# Patient Record
Sex: FEMALE | Race: WHITE | Employment: OTHER | URBAN - METROPOLITAN AREA
[De-identification: names, ages, dates, MRNs, and addresses within clinical notes are randomized per-mention and may not be internally consistent; named-entity substitution may affect disease eponyms.]

---

## 2017-01-26 ENCOUNTER — APPOINTMENT (OUTPATIENT)
Dept: GENERAL RADIOLOGY | Facility: HOSPITAL | Age: 82
End: 2017-01-26
Attending: EMERGENCY MEDICINE
Payer: MEDICARE

## 2017-01-26 ENCOUNTER — APPOINTMENT (OUTPATIENT)
Dept: CT IMAGING | Facility: HOSPITAL | Age: 82
End: 2017-01-26
Attending: EMERGENCY MEDICINE
Payer: MEDICARE

## 2017-01-26 ENCOUNTER — HOSPITAL ENCOUNTER (EMERGENCY)
Facility: HOSPITAL | Age: 82
Discharge: HOME | End: 2017-01-26
Attending: EMERGENCY MEDICINE
Payer: MEDICARE

## 2017-01-26 VITALS
WEIGHT: 140 LBS | BODY MASS INDEX: 25.76 KG/M2 | TEMPERATURE: 99 F | HEART RATE: 113 BPM | OXYGEN SATURATION: 96 % | DIASTOLIC BLOOD PRESSURE: 89 MMHG | SYSTOLIC BLOOD PRESSURE: 159 MMHG | HEIGHT: 62 IN | RESPIRATION RATE: 20 BRPM

## 2017-01-26 DIAGNOSIS — R53.1 WEAKNESS GENERALIZED: Primary | ICD-10-CM

## 2017-01-26 LAB
ALBUMIN SERPL BCP-MCNC: 4 G/DL (ref 3.5–4.8)
ALBUMIN/GLOB SERPL: 1.5 {RATIO} (ref 1–2)
ALP SERPL-CCNC: 82 U/L (ref 32–100)
ALT SERPL-CCNC: 20 U/L (ref 14–54)
ANION GAP SERPL CALC-SCNC: 11 MMOL/L (ref 0–18)
AST SERPL-CCNC: 23 U/L (ref 15–41)
BASOPHILS # BLD: 0.1 K/UL (ref 0–0.2)
BASOPHILS NFR BLD: 1 %
BILIRUB SERPL-MCNC: 0.9 MG/DL (ref 0.3–1.2)
BILIRUB UR QL: NEGATIVE
BUN SERPL-MCNC: 13 MG/DL (ref 8–20)
BUN/CREAT SERPL: 9.2 (ref 10–20)
CALCIUM SERPL-MCNC: 9.4 MG/DL (ref 8.5–10.5)
CHLORIDE SERPL-SCNC: 104 MMOL/L (ref 95–110)
CLARITY UR: CLEAR
CO2 SERPL-SCNC: 24 MMOL/L (ref 22–32)
COLOR UR: YELLOW
CREAT SERPL-MCNC: 1.41 MG/DL (ref 0.5–1.5)
EOSINOPHIL # BLD: 0.1 K/UL (ref 0–0.7)
EOSINOPHIL NFR BLD: 1 %
ERYTHROCYTE [DISTWIDTH] IN BLOOD BY AUTOMATED COUNT: 13.2 % (ref 11–15)
GLOBULIN PLAS-MCNC: 2.7 G/DL (ref 2.5–3.7)
GLUCOSE SERPL-MCNC: 111 MG/DL (ref 70–99)
GLUCOSE UR-MCNC: NEGATIVE MG/DL
HCT VFR BLD AUTO: 42.6 % (ref 35–48)
HGB BLD-MCNC: 14.5 G/DL (ref 12–16)
KETONES UR-MCNC: NEGATIVE MG/DL
LYMPHOCYTES # BLD: 1.6 K/UL (ref 1–4)
LYMPHOCYTES NFR BLD: 19 %
MCH RBC QN AUTO: 32.2 PG (ref 27–32)
MCHC RBC AUTO-ENTMCNC: 34 G/DL (ref 32–37)
MCV RBC AUTO: 94.8 FL (ref 80–100)
MONOCYTES # BLD: 0.5 K/UL (ref 0–1)
MONOCYTES NFR BLD: 6 %
NEUTROPHILS # BLD AUTO: 6.1 K/UL (ref 1.8–7.7)
NEUTROPHILS NFR BLD: 73 %
NITRITE UR QL STRIP.AUTO: NEGATIVE
OSMOLALITY UR CALC.SUM OF ELEC: 289 MOSM/KG (ref 275–295)
PH UR: 7 [PH] (ref 5–8)
PLATELET # BLD AUTO: 221 K/UL (ref 140–400)
PMV BLD AUTO: 8.8 FL (ref 7.4–10.3)
POTASSIUM SERPL-SCNC: 3.9 MMOL/L (ref 3.3–5.1)
PROT SERPL-MCNC: 6.7 G/DL (ref 5.9–8.4)
PROT UR-MCNC: NEGATIVE MG/DL
RBC # BLD AUTO: 4.49 M/UL (ref 3.7–5.4)
RBC #/AREA URNS AUTO: 3 /HPF
SODIUM SERPL-SCNC: 139 MMOL/L (ref 136–144)
SP GR UR STRIP: 1 (ref 1–1.03)
TROPONIN I SERPL-MCNC: 0.01 NG/ML (ref ?–0.03)
TSH SERPL-ACNC: 1.91 UIU/ML (ref 0.34–5.6)
UROBILINOGEN UR STRIP-ACNC: <2
VIT C UR-MCNC: NEGATIVE MG/DL
WBC # BLD AUTO: 8.4 K/UL (ref 4–11)
WBC #/AREA URNS AUTO: 2 /HPF

## 2017-01-26 PROCEDURE — 84484 ASSAY OF TROPONIN QUANT: CPT | Performed by: EMERGENCY MEDICINE

## 2017-01-26 PROCEDURE — 74176 CT ABD & PELVIS W/O CONTRAST: CPT

## 2017-01-26 PROCEDURE — 81001 URINALYSIS AUTO W/SCOPE: CPT | Performed by: EMERGENCY MEDICINE

## 2017-01-26 PROCEDURE — 80053 COMPREHEN METABOLIC PANEL: CPT | Performed by: EMERGENCY MEDICINE

## 2017-01-26 PROCEDURE — 93010 ELECTROCARDIOGRAM REPORT: CPT | Performed by: EMERGENCY MEDICINE

## 2017-01-26 PROCEDURE — 93005 ELECTROCARDIOGRAM TRACING: CPT

## 2017-01-26 PROCEDURE — 71010 XR CHEST AP PORTABLE  (CPT=71010): CPT

## 2017-01-26 PROCEDURE — 85025 COMPLETE CBC W/AUTO DIFF WBC: CPT | Performed by: EMERGENCY MEDICINE

## 2017-01-26 PROCEDURE — 96374 THER/PROPH/DIAG INJ IV PUSH: CPT

## 2017-01-26 PROCEDURE — 84443 ASSAY THYROID STIM HORMONE: CPT | Performed by: EMERGENCY MEDICINE

## 2017-01-26 PROCEDURE — 99284 EMERGENCY DEPT VISIT MOD MDM: CPT

## 2017-01-26 RX ORDER — HYDRALAZINE HYDROCHLORIDE 20 MG/ML
10 INJECTION INTRAMUSCULAR; INTRAVENOUS ONCE
Status: COMPLETED | OUTPATIENT
Start: 2017-01-26 | End: 2017-01-26

## 2017-01-26 RX ORDER — LORAZEPAM 1 MG/1
0.5 TABLET ORAL ONCE
Status: COMPLETED | OUTPATIENT
Start: 2017-01-26 | End: 2017-01-26

## 2017-01-26 NOTE — ED INITIAL ASSESSMENT (HPI)
Via [facility] from [facility], c/o weakness, \"feeling out of it\" and generally not feeling well    Denies pain, denies cough, denies fever

## 2017-01-26 NOTE — ED NOTES
Pt presents to ER with c/o \"not feeling well\" since yesterday. Pt denies cough or fever at home. Pt denies chest pain or sob. Hx of HTN. Pt c/o blurred vision, headache, and dizziness. Steady gait noted. Face symmetrical. Tongue midline.  Pupils equal and

## 2017-01-26 NOTE — ED NOTES
Pt states that she is feeling anxious and that her  is very sick and it is really upsetting her. Dr Geoff Saha at bedside to discuss plan of care with pt and pts family member at this time.

## 2017-01-28 NOTE — ED PROVIDER NOTES
Patient Seen in: [facility] AND [facility] Emergency Department    History   Patient presents with:  Weakness    Stated Complaint: not feeling well    HPI    Patient complains of gen malaise, not sleeping, elevated blood pressure for several days.    is si Take 1 tablet (40 mg total) by mouth nightly. aspirin 325 MG Oral Tab EC,  Take 325 mg by mouth daily.        Family History   Problem Relation Age of Onset   • Psychiatric Son      suicide         Smoking Status: Never Smoker                      Smokel components within normal limits   COMP METABOLIC PANEL (14) - Abnormal; Notable for the following:     Glucose 111 (*)     BUN/CREA Ratio 9.2 (*)     GFR, Non- 36 (*)     GFR, -American 43 (*)     All other components within normal l

## 2017-02-20 NOTE — PROGRESS NOTES
HPI:    Patient ID: Ailin Walton is a 80year old female. HPIpt here for a follwup and evaluation. Accompanied by son Rosemary Garrett. Is doing fairly well. Had an er visit for elevated Bp had blood pressure.     Review of Systems   Constitutional: Negative morning before breakfast. Disp: 90 capsule Rfl: 3   aspirin 325 MG Oral Tab EC Take 325 mg by mouth daily.  Disp:  Rfl:      Allergies:  Penicillins               Sulfa Antibiotics          PHYSICAL EXAM:   Physical Exam   Constitutional: She is oriented to

## 2017-02-24 ENCOUNTER — MED REC SCAN ONLY (OUTPATIENT)
Dept: INTERNAL MEDICINE CLINIC | Facility: CLINIC | Age: 82
End: 2017-02-24

## 2017-03-14 ENCOUNTER — MED REC SCAN ONLY (OUTPATIENT)
Dept: INTERNAL MEDICINE CLINIC | Facility: CLINIC | Age: 82
End: 2017-03-14

## 2017-03-15 ENCOUNTER — LAB ENCOUNTER (OUTPATIENT)
Dept: LAB | Age: 82
End: 2017-03-15
Attending: Other
Payer: MEDICARE

## 2017-03-15 DIAGNOSIS — R35.0 URINARY FREQUENCY: ICD-10-CM

## 2017-03-15 LAB
BILIRUB UR QL STRIP.AUTO: NEGATIVE
COLOR UR AUTO: YELLOW
GLUCOSE UR STRIP.AUTO-MCNC: NEGATIVE MG/DL
KETONES UR STRIP.AUTO-MCNC: NEGATIVE MG/DL
NITRITE UR QL STRIP.AUTO: NEGATIVE
PH UR STRIP.AUTO: 7 [PH] (ref 4.5–8)
PROT UR STRIP.AUTO-MCNC: 30 MG/DL
RBC #/AREA URNS AUTO: >10 /HPF
SP GR UR STRIP.AUTO: 1.01 (ref 1–1.03)
UROBILINOGEN UR STRIP.AUTO-MCNC: <2 MG/DL

## 2017-03-15 PROCEDURE — 87186 SC STD MICRODIL/AGAR DIL: CPT

## 2017-03-15 PROCEDURE — 87077 CULTURE AEROBIC IDENTIFY: CPT

## 2017-03-15 PROCEDURE — 87086 URINE CULTURE/COLONY COUNT: CPT

## 2017-03-15 PROCEDURE — 81001 URINALYSIS AUTO W/SCOPE: CPT

## 2017-06-07 ENCOUNTER — LAB REQUISITION (OUTPATIENT)
Dept: LAB | Facility: HOSPITAL | Age: 82
End: 2017-06-07
Payer: MEDICARE

## 2017-06-07 DIAGNOSIS — N39.0 URINARY TRACT INFECTION: ICD-10-CM

## 2017-06-07 PROCEDURE — 87086 URINE CULTURE/COLONY COUNT: CPT | Performed by: INTERNAL MEDICINE

## 2017-06-07 PROCEDURE — 81001 URINALYSIS AUTO W/SCOPE: CPT | Performed by: INTERNAL MEDICINE

## 2017-06-07 PROCEDURE — 87186 SC STD MICRODIL/AGAR DIL: CPT | Performed by: INTERNAL MEDICINE

## 2017-06-07 PROCEDURE — 87077 CULTURE AEROBIC IDENTIFY: CPT | Performed by: INTERNAL MEDICINE

## 2018-01-01 ENCOUNTER — LAB REQUISITION (OUTPATIENT)
Dept: LAB | Facility: HOSPITAL | Age: 83
End: 2018-01-01
Payer: MEDICARE

## 2018-01-01 ENCOUNTER — HOSPITAL ENCOUNTER (OUTPATIENT)
Dept: GENERAL RADIOLOGY | Facility: HOSPITAL | Age: 83
Discharge: HOME | End: 2018-01-01
Attending: INTERNAL MEDICINE
Payer: MEDICARE

## 2018-01-01 ENCOUNTER — APPOINTMENT (OUTPATIENT)
Dept: CT IMAGING | Facility: HOSPITAL | Age: 83
End: 2018-01-01
Attending: EMERGENCY MEDICINE
Payer: MEDICARE

## 2018-01-01 ENCOUNTER — HOSPITAL ENCOUNTER (OUTPATIENT)
Facility: HOSPITAL | Age: 83
Setting detail: OBSERVATION
Discharge: NURSING, REHAB OR LONG-TERM CARE | End: 2018-01-01
Attending: EMERGENCY MEDICINE | Admitting: INTERNAL MEDICINE
Payer: MEDICARE

## 2018-01-01 VITALS
OXYGEN SATURATION: 97 % | HEIGHT: 62 IN | BODY MASS INDEX: 23.19 KG/M2 | WEIGHT: 126 LBS | HEART RATE: 66 BPM | SYSTOLIC BLOOD PRESSURE: 154 MMHG | RESPIRATION RATE: 18 BRPM | DIASTOLIC BLOOD PRESSURE: 80 MMHG | TEMPERATURE: 98 F

## 2018-01-01 DIAGNOSIS — R47.1 DYSARTHRIA: ICD-10-CM

## 2018-01-01 DIAGNOSIS — N39.0 URINARY TRACT INFECTION WITH HEMATURIA, SITE UNSPECIFIED: Primary | ICD-10-CM

## 2018-01-01 DIAGNOSIS — R31.9 URINARY TRACT INFECTION WITH HEMATURIA, SITE UNSPECIFIED: Primary | ICD-10-CM

## 2018-01-01 DIAGNOSIS — E86.0 DEHYDRATION: ICD-10-CM

## 2018-01-01 DIAGNOSIS — N28.9 RENAL INSUFFICIENCY: ICD-10-CM

## 2018-01-01 DIAGNOSIS — G30.9 ALZHEIMER'S DISEASE, UNSPECIFIED (CODE) (HCC): ICD-10-CM

## 2018-01-01 DIAGNOSIS — R42 DIZZINESS AND GIDDINESS: ICD-10-CM

## 2018-01-01 DIAGNOSIS — M25.551 HIP PAIN, ACUTE, RIGHT: ICD-10-CM

## 2018-01-01 DIAGNOSIS — F41.9 ANXIETY DISORDER: ICD-10-CM

## 2018-01-01 DIAGNOSIS — M79.651 ACUTE THIGH PAIN, RIGHT: ICD-10-CM

## 2018-01-01 PROCEDURE — 90792 PSYCH DIAG EVAL W/MED SRVCS: CPT | Performed by: OTHER

## 2018-01-01 PROCEDURE — 73552 X-RAY EXAM OF FEMUR 2/>: CPT | Performed by: INTERNAL MEDICINE

## 2018-01-01 PROCEDURE — 82040 ASSAY OF SERUM ALBUMIN: CPT | Performed by: INTERNAL MEDICINE

## 2018-01-01 PROCEDURE — 70450 CT HEAD/BRAIN W/O DYE: CPT | Performed by: EMERGENCY MEDICINE

## 2018-01-01 PROCEDURE — 73503 X-RAY EXAM HIP UNI 4/> VIEWS: CPT | Performed by: INTERNAL MEDICINE

## 2018-01-01 RX ORDER — QUETIAPINE 25 MG/1
25 TABLET, FILM COATED ORAL NIGHTLY PRN
COMMUNITY

## 2018-01-01 RX ORDER — SODIUM CHLORIDE 450 MG/100ML
INJECTION, SOLUTION INTRAVENOUS CONTINUOUS
Status: DISCONTINUED | OUTPATIENT
Start: 2018-01-01 | End: 2018-01-01

## 2018-01-01 RX ORDER — CELLULOSE
1 CAPSULE ORAL AS NEEDED
COMMUNITY

## 2018-01-01 RX ORDER — ACETAMINOPHEN 325 MG/1
325 TABLET ORAL EVERY 6 HOURS PRN
Status: DISCONTINUED | OUTPATIENT
Start: 2018-01-01 | End: 2018-01-01

## 2018-01-01 RX ORDER — MULTIPLE VITAMINS W/ MINERALS TAB 9MG-400MCG
1 TAB ORAL DAILY
Status: DISCONTINUED | OUTPATIENT
Start: 2018-01-01 | End: 2018-01-01

## 2018-01-01 RX ORDER — ONDANSETRON 4 MG/1
4 TABLET, ORALLY DISINTEGRATING ORAL EVERY 8 HOURS PRN
COMMUNITY

## 2018-01-01 RX ORDER — RIVASTIGMINE 9.5 MG/24H
1 PATCH, EXTENDED RELEASE TRANSDERMAL DAILY
Status: DISCONTINUED | OUTPATIENT
Start: 2018-01-01 | End: 2018-01-01

## 2018-01-01 RX ORDER — CEFUROXIME AXETIL 250 MG/1
500 TABLET ORAL DAILY
Status: DISCONTINUED | OUTPATIENT
Start: 2018-01-01 | End: 2018-01-01

## 2018-01-01 RX ORDER — ACETAMINOPHEN 325 MG/1
325 TABLET ORAL EVERY 6 HOURS PRN
COMMUNITY

## 2018-01-01 RX ORDER — QUETIAPINE 25 MG/1
25 TABLET, FILM COATED ORAL NIGHTLY PRN
Status: DISCONTINUED | OUTPATIENT
Start: 2018-01-01 | End: 2018-01-01

## 2018-01-01 RX ORDER — SACCHAROMYCES BOULARDII 250 MG
250 CAPSULE ORAL 2 TIMES DAILY
Status: DISCONTINUED | OUTPATIENT
Start: 2018-01-01 | End: 2018-01-01

## 2018-01-01 RX ORDER — CEFUROXIME AXETIL 250 MG/1
250 TABLET ORAL EVERY 12 HOURS SCHEDULED
Qty: 14 TABLET | Refills: 0 | Status: SHIPPED | OUTPATIENT
Start: 2018-01-01

## 2018-01-01 RX ORDER — IBUPROFEN 200 MG
200 TABLET ORAL EVERY 6 HOURS PRN
Status: ON HOLD | COMMUNITY
End: 2018-01-01

## 2018-01-01 RX ORDER — QUETIAPINE 25 MG/1
50 TABLET, FILM COATED ORAL ONCE
Status: COMPLETED | OUTPATIENT
Start: 2018-01-01 | End: 2018-01-01

## 2018-01-01 RX ORDER — SODIUM CHLORIDE 9 MG/ML
INJECTION, SOLUTION INTRAVENOUS ONCE
Status: COMPLETED | OUTPATIENT
Start: 2018-01-01 | End: 2018-01-01

## 2018-01-01 RX ORDER — ERGOCALCIFEROL 1.25 MG/1
50000 CAPSULE ORAL WEEKLY
Status: DISCONTINUED | OUTPATIENT
Start: 2018-01-01 | End: 2018-01-01

## 2018-01-01 RX ORDER — ONDANSETRON 4 MG/1
4 TABLET, ORALLY DISINTEGRATING ORAL EVERY 8 HOURS PRN
Status: DISCONTINUED | OUTPATIENT
Start: 2018-01-01 | End: 2018-01-01

## 2018-01-01 RX ORDER — ASPIRIN 325 MG
325 TABLET, DELAYED RELEASE (ENTERIC COATED) ORAL DAILY
Status: DISCONTINUED | OUTPATIENT
Start: 2018-01-01 | End: 2018-01-01

## 2018-01-01 RX ORDER — BUSPIRONE HYDROCHLORIDE 5 MG/1
10 TABLET ORAL 3 TIMES DAILY
Status: DISCONTINUED | OUTPATIENT
Start: 2018-01-01 | End: 2018-01-01

## 2018-01-01 RX ORDER — LORAZEPAM 0.5 MG/1
0.5 TABLET ORAL 3 TIMES DAILY
Status: DISCONTINUED | OUTPATIENT
Start: 2018-01-01 | End: 2018-01-01

## 2018-01-01 RX ORDER — IBUPROFEN 200 MG
200 TABLET ORAL EVERY 6 HOURS PRN
Status: DISCONTINUED | OUTPATIENT
Start: 2018-01-01 | End: 2018-01-01

## 2018-01-01 RX ORDER — LORAZEPAM 1 MG/1
0.5 TABLET ORAL ONCE
Status: COMPLETED | OUTPATIENT
Start: 2018-01-01 | End: 2018-01-01

## 2018-01-01 RX ORDER — QUETIAPINE 25 MG/1
50 TABLET, FILM COATED ORAL 3 TIMES DAILY
Status: DISCONTINUED | OUTPATIENT
Start: 2018-01-01 | End: 2018-01-01

## 2018-01-01 RX ORDER — RIVASTIGMINE 9.5 MG/24H
1 PATCH, EXTENDED RELEASE TRANSDERMAL DAILY
Status: ON HOLD | COMMUNITY
End: 2018-01-01

## 2018-01-01 RX ORDER — ATORVASTATIN CALCIUM 40 MG/1
40 TABLET, FILM COATED ORAL NIGHTLY
Status: DISCONTINUED | OUTPATIENT
Start: 2018-01-01 | End: 2018-01-01

## 2018-01-01 RX ORDER — LORAZEPAM 0.5 MG/1
0.5 TABLET ORAL DAILY
Status: DISCONTINUED | OUTPATIENT
Start: 2018-01-01 | End: 2018-01-01

## 2018-01-01 RX ORDER — CELLULOSE
1 CAPSULE ORAL AS NEEDED
Status: DISCONTINUED | OUTPATIENT
Start: 2018-01-01 | End: 2018-01-01 | Stop reason: RX

## 2018-01-01 RX ORDER — RIVASTIGMINE 9.5 MG/24H
1 PATCH, EXTENDED RELEASE TRANSDERMAL DAILY
COMMUNITY

## 2018-01-01 RX ORDER — PANTOPRAZOLE SODIUM 40 MG/1
40 TABLET, DELAYED RELEASE ORAL
Status: DISCONTINUED | OUTPATIENT
Start: 2018-01-01 | End: 2018-01-01

## 2018-01-01 RX ORDER — BUSPIRONE HYDROCHLORIDE 5 MG/1
10 TABLET ORAL ONCE
Status: COMPLETED | OUTPATIENT
Start: 2018-01-01 | End: 2018-01-01

## 2018-05-23 PROBLEM — R47.1 DYSARTHRIA: Status: ACTIVE | Noted: 2018-01-01

## 2018-05-23 PROBLEM — N39.0 URINARY TRACT INFECTION WITH HEMATURIA, SITE UNSPECIFIED: Status: ACTIVE | Noted: 2018-01-01

## 2018-05-23 PROBLEM — R31.9 URINARY TRACT INFECTION WITH HEMATURIA: Status: ACTIVE | Noted: 2018-01-01

## 2018-05-23 PROBLEM — R31.9 URINARY TRACT INFECTION WITH HEMATURIA, SITE UNSPECIFIED: Status: ACTIVE | Noted: 2018-01-01

## 2018-05-23 PROBLEM — N28.9 RENAL INSUFFICIENCY: Status: ACTIVE | Noted: 2018-01-01

## 2018-05-23 PROBLEM — N39.0 URINARY TRACT INFECTION WITH HEMATURIA: Status: ACTIVE | Noted: 2018-01-01

## 2018-05-23 PROBLEM — E86.0 DEHYDRATION: Status: ACTIVE | Noted: 2018-01-01

## 2018-05-23 NOTE — ED PROVIDER NOTES
Patient Seen in: [facility] AND [facility] Emergency Department    History   Patient presents with:  Eval-P (psychiatric)    Stated Complaint: psych    HPI    Patient presents to the emergency department with her daughter.   She has had alteration in her mental s needed for Nausea. LORazepam 0.5 MG Oral Tab,  AT 8AM, 2 PM and 7 PM   BusPIRone HCl 10 MG Oral Tab,  Take 1 tablet (10 mg total) by mouth 3 (three) times daily.  At 8 AM, 2 PM and 7 PM   QUEtiapine Fumarate (SEROQUEL) 50 MG Oral Tab,  Take one tablet by reviewed from today and agreed except as otherwise stated in HPI.     Physical Exam   ED Triage Vitals [05/23/18 8869]  BP: 131/88  Pulse: 95  Resp: 18  Temp: 97.3 °F (36.3 °C)  Temp src: Temporal  SpO2: 97 %  O2 Device: None (Room air)    Current:BP (!) 14 GFR I did contact and talk with Dr. Willem Kirkland primary care physician we will admit patient to the hospital he did talk to her daughter as well.   No deterioration while here    I spent a total of 40 minutes of critical care time in obtaining history, performing LAVENDER   RAINBOW DRAW DARK GREEN   RAINBOW DRAW LIGHT GREEN   RAINBOW DRAW GOLD   RAINBOW DRAW LAVENDER TALL (BNP)   URINE CULTURE, ROUTINE        MDM     97% Normal  Pulse oximetry         Disposition and Plan     We recommend that you schedule follow u

## 2018-05-23 NOTE — ED INITIAL ASSESSMENT (HPI)
Patient presents to ER with daughter after making suicidal remarks reporting that she wants to slit her wrist.  Hx dementia.

## 2018-05-24 NOTE — CM/SW NOTE
Pt is from [address] [place] [address]. Per ED note, pt has hx of depression and has been inpatient for psychiatric reasons in he past. Pt's daughter concerns that pt verbalized of cutting her wrists.      Per RN/Lynn Aguilar from [facility] informed RN t

## 2018-05-24 NOTE — BH PROGRESS NOTE
Behavioral Health Note  CHIEF COMPLAINT  Suicidal comments  REASON FOR ADMISSION  Suicidal comments    SOCIAL HISTORY  The patient lives at [address] [place] [address] in the memory care unit. Patient does not smoke or use durgs.  Patient has a h/o etoh use, but koko hostage\", death of her  a year ago that she cannot remember and declining health, speech and eye sight. Most collateral information was obtained from her daughter Anshu Aj.   The patient told her daughter that she would slit her wrists and the staff pending and patient cannot be d/c'd without psychiatry clearance. 2. Patient will not benefit referrals d/t her cognitive impairment.    Saratoshia DE LA ROSAW

## 2018-05-24 NOTE — H&P
[facility] HOSP - [facility]    History and Physical    Lorenzo Elier Patient Status:  Observation    1933 MRN [id number]   Location [address] [facility] Attending Ascencion Lopez MD   Hosp Day # 0 PCP Rama Pino MD     Date:  2018  Jose Hdz hours as needed for Pain. Magnesium Hydroxide (MILK OF MAGNESIA OR) Take by mouth. ondansetron 4 MG Oral Tablet Dispersible Take 4 mg by mouth every 8 (eight) hours as needed for Nausea.    LORazepam 0.5 MG Oral Tab AT 8AM, 2 PM and 7 PM   BusPIRone HCl Psychiatric: Her behavior is normal. Her mood appears anxious. Her affect is not angry. She is not agitated, not aggressive and not combative. Cognition and memory are impaired. She expresses no homicidal and no suicidal ideation.  She expresses no suicid discharged back to her memory unit at [facility].   Discussed with daughter who is Leonard Tabares MD  5/24/2018

## 2018-05-24 NOTE — PROGRESS NOTES
Cefuroxime (Ceftin)  250 mg PO q12h was ordered for Marly Donnelly by Dr. Rekha Ortiz. Body mass index is 23.05 kg/m².   Wt Readings from Last 6 Encounters:  05/23/18 : 57.2 kg (126 lb)  01/11/18 : 61.7 kg (136 lb)  08/30/17 : 59.4 kg (131 lb)  07/13/17 :

## 2018-05-24 NOTE — CONSULTS
[facility]  Report of Psychiatric Consultation    [address] [place] [address] Patient Status:  Observation    1933 MRN [id number]   Location [facility] Attending Viky Blount MD   Hosp Day # 0 PCP Lorna Burr MD     Date of Admission: 5 occipital CVA in 9/2012. Dr. Mercedes Nunes is her current psychiatrist who comes to [facility]. Dr. Grabiel Mccoy was his psychiatrist from [id number].  He was treated with Exalon patch 13.3mg/24 hrs, Luvox 100mg twice a day, Buspar 10mg three times a day, BusPIRone HCl (BUSPAR) tab 10 mg, 10 mg, Oral, TID  •  [START ON 5/30/2018] ergocalciferol (DRISDOL/VITAMIN D2) cap 50,000 Units, 50,000 Units, Oral, Weekly  •  ibuprofen (MOTRIN) tab 200 mg, 200 mg, Oral, Q6H PRN  •  magnesium hydroxide (MILK OF MAGNESIA) limited    Laboratory Data:    Lab Results  Component Value Date   WBC 6.1 05/23/2018   HGB 13.8 05/23/2018   HCT 40.8 05/23/2018    05/23/2018   CREATSERUM 1.43 05/24/2018   BUN 23 05/24/2018    05/24/2018   K 4.1 05/24/2018    05/24/20 left cerebellum. BRAINSTEM:    Patchy areas of low attenuation likely reflect sequela of chronic small vessel ischemic disease. No edema, hemorrhage, mass, or acute infarction is seen. There is commensurate atrophy.     CALVARIUM:    There is no apparent d

## 2018-05-24 NOTE — PLAN OF CARE
Problem: SAFETY ADULT - FALL  Goal: Free from fall injury  INTERVENTIONS:  - Assess pt frequently for physical needs  - Identify cognitive and physical deficits and behaviors that affect risk of falls.   - [place] fall precautions as indicated by assessme

## 2018-05-24 NOTE — DISCHARGE SUMMARY
[facility]  Discharge Summary    Judy Mcclendon Patient Status:  Observation    1933 MRN [id number]   Location [facility] [facility] Attending Linda Sage MD   Hosp Day # 0 PCP Everett Corbin MD     Date of Admission: 2018    David 0      CONTINUE these medications which have NOT CHANGED    !! QUEtiapine Fumarate 25 MG Oral Tab  Take 25 mg by mouth nightly as needed. rivastigmine 9.5 MG/24HR Transdermal Patch 24 Hr  Place 1 patch onto the skin daily.     Placebo #00 Oral Cap  Take

## 2018-05-24 NOTE — ED NOTES
Report given to Jacquelin Hayden RN, room is still on andreea pure, will transfer pt as soon as room is ready, pt and daughter made aware

## 2018-05-24 NOTE — PLAN OF CARE
Daughter at bedside, saline lock removed. Discharge instructions reviewed with patient and her daughter. All questions answered.

## (undated) NOTE — ED AVS SNAPSHOT
[facility] Emergency Department    [address]     [address] [place] [address]    Phone:  [phone]    Fax:  Priscila Chavez   MRN: [id number]    Department:  [facility] Emergency Department   Date of Visit:  1 and Class Registration line at [phone] or find a doctor online by visiting [email or web address].    IF THERE IS ANY CHANGE OR WORSENING OF YOUR CONDITION, CALL YOUR PRIMARY CARE PHYSICIAN AT ONCE OR RETURN IMMEDIATELY TO [address] [place] [address].     If

## (undated) NOTE — IP AVS SNAPSHOT
[facility]            (For Outpatient Use Only) Initial Admit Date: 5/23/2018   Inpt/Obs Admit Date: Inpt: N/A / Obs: 05/23/18   Discharge Date:    Macy Martines:  [de-identified]   MRN: [de-identified]   CSN: [id number]        ENCOUNTER  [facility] Account Financial Class: Medicare    May 24, 2018

## (undated) NOTE — MR AVS SNAPSHOT
[facility] at [address] [place] [address] Road [address] [phone]               Thank you for choosing us for your health care visit with Mery Soriano MD.  We are glad to serve you and happy to provid Take 1 tablet by mouth daily. * OLANZapine 2.5 MG Tabs   Commonly known as:  ZYPREXA           * OLANZapine 5 MG Tabs   Take 1 tablet (5 mg total) by mouth nightly.    Commonly known as:  ZYPREXA           Pantoprazole Sodium 40 MG Tbec   Commonly

## (undated) NOTE — IP AVS SNAPSHOT
Patient Demographics     Address  [address] [place] [address] Phone  [phone] (Home) *Preferred*  [phone] [facility])      Emergency Contact(s)     Name Relation Home Work [address] [facility]    [phone]      Allergies as of ondansetron 4 MG Tbdp  Commonly known as:  ZOFRAN-ODT  Next dose due:  As needed. Take 4 mg by mouth every 8 (eight) hours as needed for Nausea. Pantoprazole Sodium 40 MG Tbec  Commonly known as:  PROTONIX  Next dose due:   Tomorrow morning be [id number] LORazepam (ATIVAN) tab 0.5 mg 05/23/18 1923 Given      [id number] LORazepam (ATIVAN) tab 0.5 mg 05/24/18 0827 Given      [id number] LORazepam (ATIVAN) tab 0.5 mg 05/24/18 1325 Given      [id number] Pantoprazole Sodium (PROTONIX) EC tab 40 mg 05/24/ ALT 17 14 - 54 U/L [facility]   AST 22 15 - 41 U/L — [facility]   Alkaline Phosphatase 48 32 - 100 U/L — [facility]   Bilirubin, Total 0.7 0.3 - 1.2 mg/dL [employer]   Total Protein 5.9 5.9 - 8.4 g/dL — [facility]   Albumin 3.6 3.5 - 4.8 g/dL Ur. Ecstasy Screen None Detected None Detected — [facility]   Comment:    Assay Cutoff: 500 ng/mL   UR.  Marijuana Screen None Detected None Detected — [facility]   Comment:    Assay Cutoff: 20 ng/mL   Ur. Methadone Screen None Detected None Detected — specimens utilizing this methodology. Clinical correlation  is recommended. [facility].  Cells Few /HPF — [facility]   Transitional Cells Moderate /HPF — [facility]   WBC Urine 304 0 - 5 /HPF [facility]   RBC URINE 11 0 - 3 /HPF [facility] Ordering provider:  Peter Serna MD  05/23/18 1749 Resulting lab:  [facility]    Specimen Information    Type Source Collected On   Blood — 05/23/18 1803          Components    Component Value Reference Range [facility]   Hold Gold Auto Resulted — — Calculated Osmolality 296 275 - 295 mOsm/kg [facility]   GFR, Non-African American 25 >=60 [facility]   GFR, African-American 29 >=60 [facility]   Comment:           Estimated GFR units: mL/min/1.73 square meters   eGFR calculated by the CKD-EP [facility]  Report of Psychiatric Consultation    [address] [place] [address] Patient Status:  Observation    1933 MRN [id number]   Location [address] [facility] Attending Sacha Hernandez MD   Hosp Day # 0 PCP Anaid Jacobsen MD     Date of Admis 1) Major neurocognitive disorder (mixed type) dx in early 2010's. Had Rt occipital CVA in 9/2012. [RH.1]     Dr. Kimi Julien is her current psychiatrist who comes to [facility]. [RH.2]    Dr. Rena Segura was his psychiatrist from 2013-[RH.1]2017[RH.2].  He was •  acetaminophen (TYLENOL) tab 325 mg, 325 mg, Oral, Q6H PRN  •  aspirin EC EC tab 325 mg, 325 mg, Oral, Daily  •  atorvastatin (LIPITOR) tab 40 mg, 40 mg, Oral, Nightly  •  BusPIRone HCl (BUSPAR) tab 10 mg, 10 mg, Oral, TID  •  [START ON 5/30/2018] ergoca Thought process:[RH.1] tangential[RH.2]  Thought content:[RH.1] no hallucinations[RH.2]  Associations: Intact    Orientation:[RH.1] oriented person ONLY[RH.2]  Attention and Concentration:[RH.1]  poor[RH.2]  Memory:[RH.1]  impaired immediate and impaired r space-occupying lesion, mass effect, or shift of midline structures. The gray-white matter junction is preserved and bilaterally symmetric in appearance.                Scattered periventricular and subcortical white matter hypodensities are present, consis Physical Therapy Notes (last 72 hours) (Notes from 5/21/2018  4:09 PM through 5/24/2018  4:09 PM)     No notes of this type exist for this encounter.       Occupational Therapy Notes (last 72 hours) (Notes from 5/21/2018  4:09 PM through 5/24/2018  4:09 PM)

## (undated) NOTE — ED AVS SNAPSHOT
[facility] Emergency Department    [address]     [address] [place] [address]    Phone:  [phone]    Fax:  Priscila Chavez   MRN: [id number]    Department:  [facility] Emergency Department   Date of Visit:  1 covered by your plan. Please contact your insurance company to determine coverage and benefits available for follow-up care and referrals.       If you have difficulty scheduling your follow-up appointment as directed, please call our  at [phone]) If you believe that any of the medications or instructions on this list is different from what your Primary Care doctor has instructed you - please continue to take your medications as instructed by your Primary Care doctor until you can check with your do Medicaid plans. To get signed up and covered, please call [phone] and ask to get set up for an insurance coverage that is in-network with [facility].         Elisat     Call the [employer] for assistance with your inactive [employer] account